# Patient Record
Sex: FEMALE | Race: BLACK OR AFRICAN AMERICAN | Employment: OTHER | URBAN - METROPOLITAN AREA
[De-identification: names, ages, dates, MRNs, and addresses within clinical notes are randomized per-mention and may not be internally consistent; named-entity substitution may affect disease eponyms.]

---

## 2021-09-30 VITALS — HEIGHT: 69 IN

## 2021-09-30 PROBLEM — E66.9 OBESITY: Status: ACTIVE | Noted: 2021-09-30

## 2021-10-19 ENCOUNTER — OFFICE VISIT (OUTPATIENT)
Dept: OBGYN CLINIC | Age: 41
End: 2021-10-19
Payer: MEDICAID

## 2021-10-19 VITALS
WEIGHT: 184.4 LBS | HEIGHT: 70 IN | DIASTOLIC BLOOD PRESSURE: 70 MMHG | BODY MASS INDEX: 26.4 KG/M2 | SYSTOLIC BLOOD PRESSURE: 120 MMHG

## 2021-10-19 DIAGNOSIS — Z31.69 INFERTILITY COUNSELING: Primary | ICD-10-CM

## 2021-10-19 PROCEDURE — 99204 OFFICE O/P NEW MOD 45 MIN: CPT | Performed by: OBSTETRICS & GYNECOLOGY

## 2021-10-19 NOTE — PROGRESS NOTES
Betzy Ray is a [de-identified] P5, 46 y.o. female   Patient's last menstrual period was 10/05/2021. She presents for her problem    She is having Pt was  x 2 previously- never got pregnant( used protection, those 2 individuals have since fathered children), with current partner x 5 years, no success in getting pregnant. Neither have had any testing performed and he has not fathered any children in the past. Pt has regular cycles and has done numerous OTC meds/supplements as well as ovulation test. .      Menstrual status:  Cycles are usually regular with a 26-32 day interval with 3-7 day duration. Flow: moderate. She does not have dysmenorrhea. Medical conditions:  Since her last annual GYN exam about ? years ago, she has not the following changes in her health history: none. Mammogram History:    ARPAN Results (most recent):  No results found for this or any previous visit. DEXA Results (most recent):  No results found for this or any previous visit. Past Medical History:   Diagnosis Date    Obesity 9/30/2021     History reviewed. No pertinent surgical history. Prior to Admission medications    Not on File       Allergies   Allergen Reactions    Penicillins Rash    Sulfa (Sulfonamide Antibiotics) Other (comments)     Headache          Tobacco History:  reports that she has never smoked. She has never used smokeless tobacco.  Alcohol Abuse:  reports previous alcohol use. Drug Abuse:  reports previous drug use. Family Medical/Cancer History:   Family History   Problem Relation Age of Onset    No Known Problems Mother     No Known Problems Father           Review of Systems   Constitutional: Negative for chills, fever, malaise/fatigue and weight loss. HENT: Negative for congestion, ear pain, sinus pain and tinnitus. Eyes: Negative for blurred vision and double vision. Respiratory: Negative for cough, shortness of breath and wheezing.     Cardiovascular: Negative for chest pain and palpitations. Gastrointestinal: Negative for abdominal pain, blood in stool, constipation, diarrhea, heartburn, nausea and vomiting. Genitourinary: Negative for dysuria, flank pain, frequency, hematuria and urgency. Musculoskeletal: Negative for joint pain and myalgias. Skin: Negative for itching and rash. Neurological: Negative for dizziness, weakness and headaches. Psychiatric/Behavioral: Negative for depression, memory loss and suicidal ideas. The patient is not nervous/anxious and does not have insomnia. Physical Exam  Constitutional:       Appearance: Normal appearance. HENT:      Head: Normocephalic and atraumatic. Neurological:      Mental Status: She is alert. Psychiatric:         Mood and Affect: Mood normal.         Behavior: Behavior normal.         Thought Content: Thought content normal.          Visit Vitals  /70   Ht 5' 9.5\" (1.765 m)   Wt 184 lb 6.4 oz (83.6 kg)   LMP 10/05/2021   BMI 26.84 kg/m²         Assessment: Diagnoses and all orders for this visit:    1. Infertility counseling    Had long DWP regarding causes of infertility( male and Female factor), DWP w/u needed- SA and HSG then DWP options going forward, ovulation induction with timed intercourse, AI, or IVF- questions addressed- rec: given her age and unsuccessful x 5 yrs need to see Infertility specialist to get above and start the process- pt agrees- names given to the pt.     Plan: Questions addressed  Counseled re: diet, exercise, healthy lifestyle  Return for Annual

## 2021-10-22 ENCOUNTER — TELEPHONE (OUTPATIENT)
Dept: OBGYN CLINIC | Age: 41
End: 2021-10-22

## 2021-10-22 NOTE — TELEPHONE ENCOUNTER
Patient states she can't get an appt with Dr Regulo Higuera until next year. She is requesting the name of another fertility specialist.  She was given the names of providers at [facility].

## 2022-03-18 PROBLEM — E66.9 OBESITY: Status: ACTIVE | Noted: 2021-09-30
